# Patient Record
Sex: FEMALE | Race: WHITE | NOT HISPANIC OR LATINO | Employment: STUDENT | URBAN - METROPOLITAN AREA
[De-identification: names, ages, dates, MRNs, and addresses within clinical notes are randomized per-mention and may not be internally consistent; named-entity substitution may affect disease eponyms.]

---

## 2022-10-22 ENCOUNTER — OFFICE VISIT (OUTPATIENT)
Dept: URGENT CARE | Facility: URGENT CARE | Age: 22
End: 2022-10-22
Payer: COMMERCIAL

## 2022-10-22 VITALS
RESPIRATION RATE: 18 BRPM | HEART RATE: 85 BPM | DIASTOLIC BLOOD PRESSURE: 85 MMHG | WEIGHT: 155 LBS | TEMPERATURE: 98.9 F | SYSTOLIC BLOOD PRESSURE: 130 MMHG | OXYGEN SATURATION: 99 %

## 2022-10-22 DIAGNOSIS — S01.81XA LACERATION OF FOREHEAD, INITIAL ENCOUNTER: Primary | ICD-10-CM

## 2022-10-22 DIAGNOSIS — S09.90XA INJURY OF HEAD, INITIAL ENCOUNTER: ICD-10-CM

## 2022-10-22 PROCEDURE — 99202 OFFICE O/P NEW SF 15 MIN: CPT | Mod: 25 | Performed by: FAMILY MEDICINE

## 2022-10-22 PROCEDURE — 12011 RPR F/E/E/N/L/M 2.5 CM/<: CPT | Performed by: FAMILY MEDICINE

## 2022-10-22 RX ORDER — HYDROXYZINE HYDROCHLORIDE 25 MG/1
TABLET, FILM COATED ORAL
COMMUNITY
Start: 2022-09-14

## 2022-10-22 RX ORDER — DEXTROAMPHETAMINE SACCHARATE, AMPHETAMINE ASPARTATE, DEXTROAMPHETAMINE SULFATE AND AMPHETAMINE SULFATE 7.5; 7.5; 7.5; 7.5 MG/1; MG/1; MG/1; MG/1
TABLET ORAL
COMMUNITY
Start: 2022-08-03

## 2022-10-22 NOTE — PROGRESS NOTES
SUBJECTIVE:     Chief Complaint   Patient presents with     Head Injury     Head injury after a hammer was hit above the left eyebrow a little over an hour ago.      Sravani Bradley is a 22 year old female who presents to the clinic with a laceration on the left forehead sustained 2 hour(s) ago.  This is a non-work related and accidental injury.    Mechanism of injury: hammer fell on the forehead.  While she was helping her father with some wiring job  Associated symptoms: Denies numbness, weakness, or loss of function  Last tetanus booster within 10 years: yes    EXAM:   The patient appears today in alert,no apparent distress distress  VITALS: /85 (BP Location: Left arm, Patient Position: Sitting, Cuff Size: Adult Regular)   Pulse 85   Temp 98.9  F (37.2  C) (Tympanic)   Resp 18   Wt 70.3 kg (155 lb)   SpO2 99%   generally- alert oriented, NAD   Her disks are flat. Pupils equal, round, reactive to light. Extraocular movements full. Visual fields full. Face moves symmetrically. Tongue midline. . Motor strength 5/5. Reflexes were 2/4.Normal position sense. Good finger-nose-finger and fine finger movement.  Size of laceration: 1 centimeters left forehead   Characteristics of the laceration: active bleeding  Tendon function intact: not applicable  Sensation to light touch intact: no  Pulses intact: not applicable  Picture included in patient's chart: no    Assessment:     Laceration of forehead, initial encounter  Injury of head, initial encounter    PLAN:  PROCEDURE NOTE::  Wound was locally injected with 1 cc's of Lidocaine 1% with epinephrine  Wound cleaned with HIBICLENS  Wound soaked  Laceration was closed using 6 0 sutures interrupted sutures 3 sutures applied.  After care instructions:  Keep wound clean and dry for the next 24-48 hours  Sutures out in 7 days  Signs of infection discussed today  Discussed with patient in details about head injury   given handout on red flag signs for head  injury  If noticing any symptoms of worsening headache or confusion or vision changes on or nausea symptoms should follow-up in the ER    Sahra Reynaga MD

## 2022-10-30 ENCOUNTER — OFFICE VISIT (OUTPATIENT)
Dept: URGENT CARE | Facility: URGENT CARE | Age: 22
End: 2022-10-30
Payer: COMMERCIAL

## 2022-10-30 VITALS
RESPIRATION RATE: 16 BRPM | WEIGHT: 159.2 LBS | HEART RATE: 97 BPM | DIASTOLIC BLOOD PRESSURE: 85 MMHG | SYSTOLIC BLOOD PRESSURE: 131 MMHG | OXYGEN SATURATION: 99 % | TEMPERATURE: 98.1 F

## 2022-10-30 DIAGNOSIS — S01.81XD LACERATION OF FOREHEAD, SUBSEQUENT ENCOUNTER: Primary | ICD-10-CM

## 2022-10-30 PROCEDURE — 99207 PR NO CHARGE LOS: CPT | Performed by: FAMILY MEDICINE

## 2022-10-30 NOTE — PROGRESS NOTES
Patient presents for suture removal. The wound is well healed without signs of infection.  The sutures are removed per protocol by MA. Return prn.